# Patient Record
Sex: MALE | Race: WHITE
[De-identification: names, ages, dates, MRNs, and addresses within clinical notes are randomized per-mention and may not be internally consistent; named-entity substitution may affect disease eponyms.]

---

## 2021-01-15 ENCOUNTER — HOSPITAL ENCOUNTER (OUTPATIENT)
Dept: HOSPITAL 19 - COL.ER | Age: 21
Setting detail: OBSERVATION
LOS: 2 days | Discharge: HOME | End: 2021-01-17
Attending: SURGERY | Admitting: SURGERY
Payer: OTHER GOVERNMENT

## 2021-01-15 VITALS — HEIGHT: 77.01 IN | BODY MASS INDEX: 22.31 KG/M2 | WEIGHT: 188.94 LBS

## 2021-01-15 VITALS — HEART RATE: 106 BPM | SYSTOLIC BLOOD PRESSURE: 119 MMHG | TEMPERATURE: 98.6 F | DIASTOLIC BLOOD PRESSURE: 78 MMHG

## 2021-01-15 VITALS — DIASTOLIC BLOOD PRESSURE: 75 MMHG | HEART RATE: 110 BPM | SYSTOLIC BLOOD PRESSURE: 177 MMHG | TEMPERATURE: 98.6 F

## 2021-01-15 DIAGNOSIS — Z88.8: ICD-10-CM

## 2021-01-15 DIAGNOSIS — Z20.822: ICD-10-CM

## 2021-01-15 DIAGNOSIS — J93.9: ICD-10-CM

## 2021-01-15 DIAGNOSIS — S22.41XA: Primary | ICD-10-CM

## 2021-01-15 LAB
ANION GAP SERPL CALC-SCNC: 8 MMOL/L (ref 7–16)
BASOPHILS # BLD: 0.1 10*3/UL (ref 0–0.2)
BASOPHILS NFR BLD AUTO: 0.7 % (ref 0–2)
BUN SERPL-MCNC: 10 MG/DL (ref 9–20)
CALCIUM SERPL-MCNC: 9.3 MG/DL (ref 8.4–10.2)
CHLORIDE SERPL-SCNC: 102 MMOL/L (ref 98–107)
CO2 SERPL-SCNC: 30 MMOL/L (ref 22–30)
CREAT SERPL-SCNC: 0.81 UMOL/L (ref 0.66–1.25)
EOSINOPHIL # BLD: 0.1 10*3/UL (ref 0–0.7)
EOSINOPHIL NFR BLD: 0.9 % (ref 0–4)
ERYTHROCYTE [DISTWIDTH] IN BLOOD BY AUTOMATED COUNT: 12.9 % (ref 11.5–14.5)
GLUCOSE SERPL-MCNC: 121 MG/DL (ref 74–106)
GRANULOCYTES # BLD AUTO: 53.8 % (ref 42.2–75.2)
HCT VFR BLD AUTO: 45.9 % (ref 36–47)
HGB BLD-MCNC: 15.6 G/DL (ref 12.5–16.1)
LYMPHOCYTES # BLD: 3.3 10*3/UL (ref 1.2–3.4)
LYMPHOCYTES NFR BLD: 38 % (ref 20–51)
MCH RBC QN AUTO: 31 PG (ref 26–32)
MCHC RBC AUTO-ENTMCNC: 34 G/DL (ref 33–37)
MCV RBC AUTO: 91 FL (ref 80–95)
MONOCYTES # BLD: 0.6 10*3/UL (ref 0.1–0.6)
MONOCYTES NFR BLD AUTO: 6.3 % (ref 1.7–9.3)
NEUTROPHILS # BLD: 4.7 10*3/UL (ref 1.4–6.5)
PLATELET # BLD AUTO: 339 K/MM3 (ref 130–400)
PMV BLD AUTO: 9.5 FL (ref 7.4–10.4)
POTASSIUM SERPL-SCNC: 3.8 MMOL/L (ref 3.4–5)
RBC # BLD AUTO: 5.04 M/MM3 (ref 4.2–5.6)
SODIUM SERPL-SCNC: 140 MMOL/L (ref 137–145)

## 2021-01-15 PROCEDURE — G0378 HOSPITAL OBSERVATION PER HR: HCPCS

## 2021-01-15 PROCEDURE — A9284 NON-ELECTRONIC SPIROMETER: HCPCS

## 2021-01-15 NOTE — NUR
MEDICATED WITH MORPHINE 2MG IVP FOR RT SIDE PAIN. HAS SL TO LEFT AC, FLUSHES
WELL. FATHER AT BEDSIDE.

## 2021-01-16 VITALS — DIASTOLIC BLOOD PRESSURE: 62 MMHG | HEART RATE: 100 BPM | TEMPERATURE: 98.1 F | SYSTOLIC BLOOD PRESSURE: 114 MMHG

## 2021-01-16 VITALS — SYSTOLIC BLOOD PRESSURE: 117 MMHG | HEART RATE: 82 BPM | DIASTOLIC BLOOD PRESSURE: 69 MMHG | TEMPERATURE: 98 F

## 2021-01-16 VITALS — DIASTOLIC BLOOD PRESSURE: 76 MMHG | TEMPERATURE: 98 F | SYSTOLIC BLOOD PRESSURE: 110 MMHG | HEART RATE: 91 BPM

## 2021-01-16 VITALS — DIASTOLIC BLOOD PRESSURE: 68 MMHG | TEMPERATURE: 97.7 F | HEART RATE: 76 BPM | SYSTOLIC BLOOD PRESSURE: 116 MMHG

## 2021-01-16 VITALS — SYSTOLIC BLOOD PRESSURE: 115 MMHG | HEART RATE: 83 BPM | TEMPERATURE: 97.9 F | DIASTOLIC BLOOD PRESSURE: 71 MMHG

## 2021-01-16 VITALS — DIASTOLIC BLOOD PRESSURE: 70 MMHG | SYSTOLIC BLOOD PRESSURE: 109 MMHG | TEMPERATURE: 98.5 F | HEART RATE: 89 BPM

## 2021-01-16 VITALS — TEMPERATURE: 98.4 F | SYSTOLIC BLOOD PRESSURE: 105 MMHG | DIASTOLIC BLOOD PRESSURE: 68 MMHG | HEART RATE: 95 BPM

## 2021-01-16 NOTE — NUR
DAMARI (Maria Isabel) met with patient to conduct intake evaluation.  Patient lives at
home in [place] with his mother Mary (P# [phone]) and father Benigno
(P# [phone]).  Patient is autistic and requires assistance with
activities of daily living; however, his mother and father meet all of those
needs adequately for him.  Patient requires no DME.  Patient's PCP and
pharmacy are on [address]. Mother was unsure of of names.  Patient does not
require assistance affording medications at this time.  Patient plans to
return home with mom and dad.  SW should check back for name of PCP and
pharmacy tomorrow.  Patient and mother deny questions or concerns at this
time.  Social work will continue to follow.

## 2021-01-16 NOTE — NUR
Pt awake, denies pain. Takes scheduled Motrin 800mg po at this time. Oxygen on
at 2L/nc. SL to left AC without redness or swelling. Dad Benigno at bedside.

## 2021-01-16 NOTE — NUR
PATIENT SHIFT ASSESSMENT COMPLETED AT THIS TIME. PATIENT IS AWAKE AND RESTING
IN BED WITH MOTHER PRESENT AT THE BEDSIDE. WHEN ASKED IF HE IS IN PAIN THE
PATIENT SHAKES HIS HEAD AND STATES "NO". ABRASION TO RIGHT FLANK, DELLA AND
NON-DRAINING. SHALLOW BREATHING NOTED. LEFT LUNG PRINCE CLEAR UPON
AUSCULTATION. RIGHT LUNG PRINCE DIMINISHED UPON AUSCULTATION. PATIENT DENIES
SOB OR CHEST PAIN. VSS. CALL LIGHT IN REACH. NO NEEDS AT THIS TIME.

## 2021-01-16 NOTE — NUR
PATIENT PLACED ON 2L O2 VIA NASAL CANNULA PER 'S ORDERS. PATIENT AND
MOTHER EDUCATED ON NEED FOR OXYGEN. AM MEDICATIONS GIVEN AT THIS TIME.

## 2021-01-16 NOTE — NUR
WHEN ASKED ABOUT PAIN, PATIENT REPORTS HE IS UNSURE OF HIS PAIN LEVEL. WHEN
ASKED IF HE NEEDS MEDICINE HE STATES, "NO". PATIENT APPEARS TO BE COMFORTABLE
AT THIS TIME. NO GRABBING,GUARDING, FACIAL GRIMACING NOTED. MOTHER PRESENT AT
THE BEDSIDE AND REPORTS THAT THE PATIENT IS HUNGRY AND WAITING ON HIS DINNER
TRAY. PATIENT GIVEN JESSICA CRACKERS AS A SNACK UNTIL DINNER ARRIVES. NO OTHER
NEEDS AT THIS TIME. WILL CONTINUE TO MONITOR.

## 2021-01-16 NOTE — NUR
SCD'S APPLIED TO BLE. FATHER PRESENT IN THE ROOM AT THIS TIME. FATHER AND SON
EDUCATED ON SCD USE. PATIENT WORKING ON DINNER TRAY. NO NEEDS AT THIS TIME.
WILL REPORT OFF TO ONCOMING NURSE.

## 2021-01-17 VITALS — TEMPERATURE: 98.1 F | SYSTOLIC BLOOD PRESSURE: 121 MMHG | HEART RATE: 93 BPM | DIASTOLIC BLOOD PRESSURE: 73 MMHG

## 2021-01-17 VITALS — SYSTOLIC BLOOD PRESSURE: 114 MMHG | HEART RATE: 89 BPM | TEMPERATURE: 98.2 F | DIASTOLIC BLOOD PRESSURE: 74 MMHG

## 2021-01-17 VITALS — HEART RATE: 89 BPM | SYSTOLIC BLOOD PRESSURE: 121 MMHG | TEMPERATURE: 98.2 F | DIASTOLIC BLOOD PRESSURE: 67 MMHG

## 2021-01-17 NOTE — NUR
LEFT AC INT DISCONTINUED PER PENDING DISCHARGE. TIP INTACT. PATIENT TOLERATED
WELL. DISCHARGE INTRUCTIONS REVIEWED WITH PATIENT AND FATHER. QUESTIONS SOUGHT
AND ANSWERED. PATIENT PERSONAL BELONGINGS GATHERED. AWAITING MOTHER FOR RIDE
FOR DISCHARGE.

## 2021-01-17 NOTE — NUR
PATIENT AM SHIFT ASSESSMENT COMPLETED. AM MEDICATIONS ADMINISTERED. PATIENT
DENIES COMPLAINTS OF SOB OR DIFFICULTIES BREATHING. SHALLOW BREATHING NOTED.
ALL LEFT LUNG FIELDS CLEAR UPON AUSCULTATION. RIGHT LUNG PRINCE DIMINISHED,
BUT IMPROVED FROM THIS NURSES ASSESSMENT YESTERDAY AM. 02 AT 2L IN PLACE.
ABRASION WITH ECCHYMOSIS TO RIGHT FLANK. SCD'S TO BLE. PATIENT REPORTS PAIN IS
GOOD THIS MORNING. FATHER PRESENT AT THE BEDSIDE. PATIENT AND FATHER AWAITING
CHEST X-RAY. CALL LIGHT WITHIN REACH. NO OTHER NEEDS AT THIS TIME.

## 2021-06-20 ENCOUNTER — HOSPITAL ENCOUNTER (EMERGENCY)
Dept: HOSPITAL 19 - COL.ER | Age: 21
Discharge: HOME | End: 2021-06-20
Attending: EMERGENCY MEDICINE
Payer: OTHER GOVERNMENT

## 2021-06-20 VITALS — WEIGHT: 175.27 LBS | BODY MASS INDEX: 23.74 KG/M2 | HEIGHT: 72.01 IN

## 2021-06-20 VITALS — DIASTOLIC BLOOD PRESSURE: 94 MMHG | SYSTOLIC BLOOD PRESSURE: 139 MMHG | HEART RATE: 111 BPM

## 2021-06-20 VITALS — TEMPERATURE: 98.3 F

## 2021-06-20 DIAGNOSIS — F84.0: Primary | ICD-10-CM

## 2021-06-20 DIAGNOSIS — Z88.8: ICD-10-CM

## 2021-06-20 DIAGNOSIS — F91.3: ICD-10-CM

## 2021-06-20 LAB
ACETONE SERPL-MCNC: NEGATIVE MG/DL
ALBUMIN SERPL-MCNC: 4.4 GM/DL (ref 3.5–5)
ALP SERPL-CCNC: 91 U/L (ref 50–136)
ALT SERPL-CCNC: 48 U/L (ref 4–49)
ANION GAP SERPL CALC-SCNC: 7 MMOL/L (ref 7–16)
AST SERPL-CCNC: 40 U/L (ref 15–37)
BASOPHILS # BLD: 0 10*3/UL (ref 0–0.2)
BASOPHILS NFR BLD AUTO: 0.5 % (ref 0–2)
BILIRUB SERPL-MCNC: 0.6 MG/DL (ref 0–1)
BUN SERPL-MCNC: 15 MG/DL (ref 9–20)
CALCIUM SERPL-MCNC: 9.6 MG/DL (ref 8.4–10.2)
CHLORIDE SERPL-SCNC: 104 MMOL/L (ref 98–107)
CO2 SERPL-SCNC: 30 MMOL/L (ref 22–30)
CREAT SERPL-SCNC: 0.86 UMOL/L (ref 0.66–1.25)
DRUGS UR SCN NOM: NEGATIVE NG/ML
EOSINOPHIL # BLD: 0 10*3/UL (ref 0–0.7)
EOSINOPHIL NFR BLD: 0.4 % (ref 0–4)
ERYTHROCYTE [DISTWIDTH] IN BLOOD BY AUTOMATED COUNT: 13.1 % (ref 11.5–14.5)
ETHANOL SPEC-SCNC: < 10 MG/DL
GLUCOSE SERPL-MCNC: 96 MG/DL (ref 74–106)
GRANULOCYTES # BLD AUTO: 78.5 % (ref 42.2–75.2)
HCT VFR BLD AUTO: 45.9 % (ref 42–52)
HGB BLD-MCNC: 15.6 G/DL (ref 13.5–18)
LYMPHOCYTES # BLD: 1.1 10*3/UL (ref 1.2–3.4)
LYMPHOCYTES NFR BLD: 13.2 % (ref 20–51)
MCH RBC QN AUTO: 30 PG (ref 27–31)
MCHC RBC AUTO-ENTMCNC: 34 G/DL (ref 33–37)
MCV RBC AUTO: 88 FL (ref 80–100)
MONOCYTES # BLD: 0.6 10*3/UL (ref 0.1–0.6)
MONOCYTES NFR BLD AUTO: 6.9 % (ref 1.7–9.3)
NEUTROPHILS # BLD: 6.5 10*3/UL (ref 1.4–6.5)
PH UR STRIP.AUTO: 6 [PH] (ref 5–8)
PLATELET # BLD AUTO: 244 K/MM3 (ref 130–400)
PMV BLD AUTO: 9.6 FL (ref 7.4–10.4)
POTASSIUM SERPL-SCNC: 3.9 MMOL/L (ref 3.4–5)
PROT SERPL-MCNC: 7.5 GM/DL (ref 6.4–8.2)
RBC # BLD AUTO: 5.2 M/MM3 (ref 4.2–5.6)
RBC # UR: (no result) /HPF
SALICYLATES SERPL-MCNC: < 1 MG/DL
SODIUM SERPL-SCNC: 141 MMOL/L (ref 137–145)
SP GR UR STRIP.AUTO: 1.02 (ref 1–1.03)
TSH SERPL DL<=0.005 MIU/L-ACNC: 0.95 UIU/ML (ref 0.47–4.68)
URN COLLECT METHOD CLASS: (no result)

## 2022-05-16 ENCOUNTER — HOSPITAL ENCOUNTER (OUTPATIENT)
Dept: HOSPITAL 19 - COL.RAD | Age: 22
End: 2022-05-16
Payer: OTHER GOVERNMENT

## 2022-05-16 DIAGNOSIS — R59.0: Primary | ICD-10-CM

## 2022-07-24 ENCOUNTER — HOSPITAL ENCOUNTER (EMERGENCY)
Dept: HOSPITAL 19 - COL.ER | Age: 22
LOS: 1 days | Discharge: HOME | End: 2022-07-25
Attending: EMERGENCY MEDICINE
Payer: OTHER GOVERNMENT

## 2022-07-24 VITALS — HEIGHT: 72.01 IN | BODY MASS INDEX: 16.96 KG/M2 | WEIGHT: 125.22 LBS

## 2022-07-24 DIAGNOSIS — R45.851: Primary | ICD-10-CM

## 2022-07-24 DIAGNOSIS — Z20.822: ICD-10-CM

## 2022-07-24 LAB
ALBUMIN SERPL-MCNC: 4.6 GM/DL (ref 3.5–5)
ALP SERPL-CCNC: 74 U/L (ref 40–150)
ALT SERPL-CCNC: 14 U/L (ref 0–55)
ANION GAP SERPL CALC-SCNC: 15 MMOL/L (ref 7–16)
APAP SERPL-MCNC: < 1 UG/ML (ref 10–30)
AST SERPL-CCNC: 13 U/L (ref 5–34)
BASOPHILS # BLD: 0 K/MM3 (ref 0–0.2)
BASOPHILS NFR BLD AUTO: 0.4 % (ref 0–2)
BILIRUB SERPL-MCNC: 0.8 MG/DL (ref 0.2–1.2)
BUN SERPL-MCNC: 15 MG/DL (ref 9–21)
CALCIUM SERPL-MCNC: 9.5 MG/DL (ref 8.4–10.2)
CHLORIDE SERPL-SCNC: 104 MMOL/L (ref 98–107)
CO2 SERPL-SCNC: 24 MMOL/L (ref 22–29)
CREAT SERPL-SCNC: 0.87 MG/DL (ref 0.72–1.25)
DRUGS UR SCN NOM: POSITIVE NG/ML
EOSINOPHIL # BLD: 0 K/MM3 (ref 0–0.7)
EOSINOPHIL NFR BLD: 0.2 % (ref 0–4)
ERYTHROCYTE [DISTWIDTH] IN BLOOD BY AUTOMATED COUNT: 12.6 % (ref 11.5–14.5)
ETHANOL SPEC-SCNC: < 10 MG/DL (ref 0–10)
GLUCOSE SERPL-MCNC: 95 MG/DL (ref 70–99)
GRANULOCYTES # BLD AUTO: 75 % (ref 42.2–75.2)
HCT VFR BLD AUTO: 41.6 % (ref 42–52)
HGB BLD-MCNC: 14.7 G/DL (ref 13.5–18)
LYMPHOCYTES # BLD: 1.4 K/MM3 (ref 1.2–3.4)
LYMPHOCYTES NFR BLD: 15.3 % (ref 20–51)
MCH RBC QN AUTO: 31 PG (ref 27–31)
MCHC RBC AUTO-ENTMCNC: 35 G/DL (ref 33–37)
MCV RBC AUTO: 87 FL (ref 80–100)
MONOCYTES # BLD: 0.8 K/MM3 (ref 0.1–0.6)
MONOCYTES NFR BLD AUTO: 8.8 % (ref 1.7–9.3)
NEUTROPHILS # BLD: 7.1 K/MM3 (ref 1.4–6.5)
PLATELET # BLD AUTO: 213 K/MM3 (ref 130–400)
PMV BLD AUTO: 9.5 FL (ref 7.4–10.4)
POTASSIUM SERPL-SCNC: 3.7 MMOL/L (ref 3.5–4.5)
PROT SERPL-MCNC: 7.3 GM/DL (ref 6.2–8.1)
RBC # BLD AUTO: 4.79 M/MM3 (ref 4.2–5.6)
SALICYLATES SERPL-MCNC: < 5 MG/DL (ref 15–30)
SODIUM SERPL-SCNC: 143 MMOL/L (ref 136–145)

## 2022-07-25 VITALS — HEART RATE: 103 BPM | SYSTOLIC BLOOD PRESSURE: 133 MMHG | DIASTOLIC BLOOD PRESSURE: 84 MMHG

## 2022-07-25 VITALS — SYSTOLIC BLOOD PRESSURE: 121 MMHG | HEART RATE: 100 BPM | DIASTOLIC BLOOD PRESSURE: 77 MMHG

## 2022-07-25 VITALS — SYSTOLIC BLOOD PRESSURE: 122 MMHG | DIASTOLIC BLOOD PRESSURE: 69 MMHG | TEMPERATURE: 97.9 F | HEART RATE: 89 BPM

## 2022-07-25 VITALS — TEMPERATURE: 98 F

## 2022-07-25 VITALS — HEART RATE: 76 BPM | DIASTOLIC BLOOD PRESSURE: 67 MMHG | SYSTOLIC BLOOD PRESSURE: 122 MMHG

## 2022-07-25 NOTE — NUR
PT AWAKE AFTER A LONG NAP. VITALS OBTAINED- PT WATCHING BASEBALL AND PLAYING
ON THE IPAD. DENIES NEEDS AT THIS TIME

## 2022-07-25 NOTE — NUR
WARM BLANKETS GIVEN- PT ASKES IF HE HAS BEEN GOOD. RN ASSURES PT HE HAS BEEN
GOOD- ENCOURAGED PT TO TRY TO LAY DOWN AND CLOSE HIS AND REST- PT SATES IT IS
HARD FOR HIM TO SLEEP HERE.

## 2022-08-28 ENCOUNTER — HOSPITAL ENCOUNTER (EMERGENCY)
Dept: HOSPITAL 19 - COL.ER | Age: 22
Discharge: HOME | End: 2022-08-28
Attending: PERSONAL EMERGENCY RESPONSE ATTENDANT
Payer: OTHER GOVERNMENT

## 2022-08-28 VITALS — HEART RATE: 100 BPM | DIASTOLIC BLOOD PRESSURE: 67 MMHG | TEMPERATURE: 98.3 F | SYSTOLIC BLOOD PRESSURE: 119 MMHG

## 2022-08-28 VITALS — HEIGHT: 72.01 IN | BODY MASS INDEX: 18.33 KG/M2 | WEIGHT: 135.36 LBS

## 2022-08-28 DIAGNOSIS — F84.0: Primary | ICD-10-CM

## 2022-08-28 DIAGNOSIS — Z88.8: ICD-10-CM

## 2022-08-28 DIAGNOSIS — Z20.822: ICD-10-CM

## 2022-08-28 LAB
ALBUMIN SERPL-MCNC: 4.6 GM/DL (ref 3.5–5)
ALP SERPL-CCNC: 96 U/L (ref 40–150)
ALT SERPL-CCNC: 12 U/L (ref 0–55)
ANION GAP SERPL CALC-SCNC: 12 MMOL/L (ref 7–16)
APAP SERPL-MCNC: < 1 UG/ML (ref 10–30)
AST SERPL-CCNC: 12 U/L (ref 5–34)
BASOPHILS # BLD: 0.1 K/MM3 (ref 0–0.2)
BASOPHILS NFR BLD AUTO: 0.8 % (ref 0–2)
BILIRUB SERPL-MCNC: 0.5 MG/DL (ref 0.2–1.2)
BUN SERPL-MCNC: 10 MG/DL (ref 9–21)
CALCIUM SERPL-MCNC: 9.7 MG/DL (ref 8.4–10.2)
CHLORIDE SERPL-SCNC: 104 MMOL/L (ref 98–107)
CO2 SERPL-SCNC: 25 MMOL/L (ref 22–29)
CREAT SERPL-SCNC: 0.8 MG/DL (ref 0.72–1.25)
DRUGS UR SCN NOM: NEGATIVE NG/ML
EOSINOPHIL # BLD: 0.1 K/MM3 (ref 0–0.7)
EOSINOPHIL NFR BLD: 0.8 % (ref 0–4)
ERYTHROCYTE [DISTWIDTH] IN BLOOD BY AUTOMATED COUNT: 13.2 % (ref 11.5–14.5)
ETHANOL SPEC-SCNC: < 10 MG/DL (ref 0–10)
GLUCOSE SERPL-MCNC: 113 MG/DL (ref 70–99)
GRANULOCYTES # BLD AUTO: 72.1 % (ref 42.2–75.2)
HCT VFR BLD AUTO: 43.5 % (ref 42–52)
HGB BLD-MCNC: 15.3 G/DL (ref 13.5–18)
LYMPHOCYTES # BLD: 1.2 K/MM3 (ref 1.2–3.4)
LYMPHOCYTES NFR BLD: 18.8 % (ref 20–51)
MCH RBC QN AUTO: 31 PG (ref 27–31)
MCHC RBC AUTO-ENTMCNC: 35 G/DL (ref 33–37)
MCV RBC AUTO: 88 FL (ref 80–100)
MONOCYTES # BLD: 0.5 K/MM3 (ref 0.1–0.6)
MONOCYTES NFR BLD AUTO: 7.2 % (ref 1.7–9.3)
NEUTROPHILS # BLD: 4.5 K/MM3 (ref 1.4–6.5)
PH UR STRIP.AUTO: 7 [PH] (ref 5–8.5)
PLATELET # BLD AUTO: 250 K/MM3 (ref 130–400)
PMV BLD AUTO: 9.7 FL (ref 7.4–10.4)
POTASSIUM SERPL-SCNC: 3.3 MMOL/L (ref 3.5–4.5)
PROT SERPL-MCNC: 7.5 GM/DL (ref 6.2–8.1)
RBC # BLD AUTO: 4.92 M/MM3 (ref 4.2–5.6)
RBC # UR: (no result) /HPF (ref 0–2)
SALICYLATES SERPL-MCNC: < 5 MG/DL (ref 15–30)
SODIUM SERPL-SCNC: 141 MMOL/L (ref 136–145)
SP GR UR STRIP.AUTO: 1.02 (ref 1–1.03)
TSH SERPL DL<=0.005 MIU/L-ACNC: 0.52 UIU/ML (ref 0.35–4.94)
URN COLLECT METHOD CLASS: (no result)
UROBILINOGEN UR STRIP.AUTO-MCNC: 0.2 E.U/DL (ref 0.2–1)

## 2022-09-17 NOTE — NUR
PT STANDS AT WINDOW. DENIES NEEDS- STATES HE WANTS TO SLEEP- BUT IT IS HARD
FOR HIM. Spontaneous, unlabored and symmetrical